# Patient Record
Sex: MALE | Race: WHITE | Employment: FULL TIME | URBAN - METROPOLITAN AREA
[De-identification: names, ages, dates, MRNs, and addresses within clinical notes are randomized per-mention and may not be internally consistent; named-entity substitution may affect disease eponyms.]

---

## 2021-02-05 ENCOUNTER — APPOINTMENT (OUTPATIENT)
Dept: RADIOLOGY | Facility: CLINIC | Age: 26
End: 2021-02-05
Payer: COMMERCIAL

## 2021-02-05 ENCOUNTER — OFFICE VISIT (OUTPATIENT)
Dept: URGENT CARE | Facility: CLINIC | Age: 26
End: 2021-02-05
Payer: COMMERCIAL

## 2021-02-05 VITALS
OXYGEN SATURATION: 98 % | BODY MASS INDEX: 22.22 KG/M2 | RESPIRATION RATE: 18 BRPM | TEMPERATURE: 97.7 F | WEIGHT: 150 LBS | HEART RATE: 98 BPM | DIASTOLIC BLOOD PRESSURE: 83 MMHG | SYSTOLIC BLOOD PRESSURE: 151 MMHG | HEIGHT: 69 IN

## 2021-02-05 DIAGNOSIS — S42.022A CLOSED DISPLACED FRACTURE OF SHAFT OF LEFT CLAVICLE, INITIAL ENCOUNTER: Primary | ICD-10-CM

## 2021-02-05 DIAGNOSIS — M89.8X1 PAIN OF LEFT CLAVICLE: ICD-10-CM

## 2021-02-05 PROCEDURE — 73000 X-RAY EXAM OF COLLAR BONE: CPT

## 2021-02-05 PROCEDURE — 99203 OFFICE O/P NEW LOW 30 MIN: CPT | Performed by: NURSE PRACTITIONER

## 2021-02-05 RX ORDER — CLONAZEPAM 0.5 MG/1
0.75 TABLET ORAL 2 TIMES DAILY
COMMUNITY

## 2021-02-05 NOTE — PATIENT INSTRUCTIONS
Rest, ice  Keep immobilizer on  Motrin andTylenol as needed for pain  If you develop any increased pain, swelling, numbness, tingling, or any new or concerning symptoms please return or proceed to ER  Follow up with pcp in 3-5 days  Recommend proceeding to ER to see ortho  Clavicle Fracture   WHAT YOU NEED TO KNOW:   A clavicle fracture is a crack or break in your clavicle (collarbone)  DISCHARGE INSTRUCTIONS:   Return to the emergency department if:   · Your shoulder, arm, hand, or fingers turn blue or pale, or feel cold or numb  · Your pain gets worse, even after rest and medicine  · Your splint feels tight, or you have increased swelling  · You cannot move your fingers  Call your doctor if:   · Your sling or wrap comes off or gets damaged  · You have questions or concerns about your condition or care  Medicines: You may  need any of the following:  · Acetaminophen  decreases pain and fever  It is available without a doctor's order  Ask how much to take and how often to take it  Follow directions  Read the labels of all other medicines you are using to see if they also contain acetaminophen, or ask your doctor or pharmacist  Acetaminophen can cause liver damage if not taken correctly  Do not use more than 4 grams (4,000 milligrams) total of acetaminophen in one day  · NSAIDs , such as ibuprofen, help decrease swelling, pain, and fever  This medicine is available with or without a doctor's order  NSAIDs can cause stomach bleeding or kidney problems in certain people  If you take blood thinner medicine, always ask your healthcare provider if NSAIDs are safe for you  Always read the medicine label and follow directions  · Take your medicine as directed  Contact your healthcare provider if you think your medicine is not helping or if you have side effects  Tell him or her if you are allergic to any medicine  Keep a list of the medicines, vitamins, and herbs you take   Include the amounts, and when and why you take them  Bring the list or the pill bottles to follow-up visits  Carry your medicine list with you in case of an emergency  Sling or brace care: You will have a sling or a brace to keep your clavicle from moving while it heals  Ask your healthcare provider for more information on how to care for the sling or brace, including how to adjust it  Apply ice:  Apply ice on your clavicle for 15 to 20 minutes every hour or as directed  Use an ice pack, or put crushed ice in a plastic bag  Cover the bag with a towel before you apply it to your clavicle  Ice decreases swelling and pain  Activity:  Limit activity as directed by your healthcare provider  Slowly start to do more each day as the pain decreases  Physical therapy:  Physical therapy may be recommended after your clavicle heals  A physical therapist teaches you exercises to help improve movement and strength, and to decrease pain  Follow up with your doctor within 1 week or as directed: You may need to return for more x-rays to see how well your clavicle is healing  Write down your questions so you remember to ask them during your visits  © Copyright [address] [place] [address] Information is for End User's use only and may not be sold, redistributed or otherwise used for commercial purposes  All illustrations and images included in CareNotes® are the copyrighted property of [employer]  or [facility] [employer]   The above information is an  only  It is not intended as medical advice for individual conditions or treatments  Talk to your doctor, nurse or pharmacist before following any medical regimen to see if it is safe and effective for you

## 2021-02-05 NOTE — PROGRESS NOTES
[facility]        NAME: Yoel Simmons is a 22 y o  male  : 1995    MRN: [id number]  DATE: 2021  TIME: 5:12 PM    Assessment and Plan   Pain of left clavicle [M89 8X1]  1  Pain of left clavicle  XR clavicle left     Left clavicular fracture on xray  Shoulder immobilizer applied  Patient was advised to proceed to ER for further evaluation, patient refusing at this time  Risks explained in length, patient verbalizes understanding  Patient states that he lives 2 hours away and he will go home and go to an ER there  AMA form signed and on chart/      Patient Instructions     Patient Instructions     Rest, ice  Keep immobilizer on  Motrin andTylenol as needed for pain  If you develop any increased pain, swelling, numbness, tingling, or any new or concerning symptoms please return or proceed to ER  Follow up with pcp in 3-5 days  Recommend proceeding to ER to see ortho  Clavicle Fracture   WHAT YOU NEED TO KNOW:   A clavicle fracture is a crack or break in your clavicle (collarbone)  DISCHARGE INSTRUCTIONS:   Return to the emergency department if:   · Your shoulder, arm, hand, or fingers turn blue or pale, or feel cold or numb  · Your pain gets worse, even after rest and medicine  · Your splint feels tight, or you have increased swelling  · You cannot move your fingers  Call your doctor if:   · Your sling or wrap comes off or gets damaged  · You have questions or concerns about your condition or care  Medicines: You may  need any of the following:  · Acetaminophen  decreases pain and fever  It is available without a doctor's order  Ask how much to take and how often to take it  Follow directions  Read the labels of all other medicines you are using to see if they also contain acetaminophen, or ask your doctor or pharmacist  Acetaminophen can cause liver damage if not taken correctly   Do not use more than 4 grams (4,000 milligrams) total of acetaminophen in one day  · NSAIDs , such as ibuprofen, help decrease swelling, pain, and fever  This medicine is available with or without a doctor's order  NSAIDs can cause stomach bleeding or kidney problems in certain people  If you take blood thinner medicine, always ask your healthcare provider if NSAIDs are safe for you  Always read the medicine label and follow directions  · Take your medicine as directed  Contact your healthcare provider if you think your medicine is not helping or if you have side effects  Tell him or her if you are allergic to any medicine  Keep a list of the medicines, vitamins, and herbs you take  Include the amounts, and when and why you take them  Bring the list or the pill bottles to follow-up visits  Carry your medicine list with you in case of an emergency  Sling or brace care: You will have a sling or a brace to keep your clavicle from moving while it heals  Ask your healthcare provider for more information on how to care for the sling or brace, including how to adjust it  Apply ice:  Apply ice on your clavicle for 15 to 20 minutes every hour or as directed  Use an ice pack, or put crushed ice in a plastic bag  Cover the bag with a towel before you apply it to your clavicle  Ice decreases swelling and pain  Activity:  Limit activity as directed by your healthcare provider  Slowly start to do more each day as the pain decreases  Physical therapy:  Physical therapy may be recommended after your clavicle heals  A physical therapist teaches you exercises to help improve movement and strength, and to decrease pain  Follow up with your doctor within 1 week or as directed: You may need to return for more x-rays to see how well your clavicle is healing  Write down your questions so you remember to ask them during your visits  © Copyright [address] Information is for End User's use only and may not be sold, redistributed or otherwise used for commercial purposes   All illustrations and images included in CareNotes® are the copyrighted property of [employer]  or [facility] [employer]   The above information is an  only  It is not intended as medical advice for individual conditions or treatments  Talk to your doctor, nurse or pharmacist before following any medical regimen to see if it is safe and effective for you  Follow up with PCP in 3-5 days  Proceed to  ER if symptoms worsen  Chief Complaint     Chief Complaint   Patient presents with    Collarbone Injury     collar bone or shoulder injury skiing today  History of Present Illness       22year old presents today with pain to his left clavicle  He reports he was snowboarding at [employer] and he went over a jump and landed with his arm outstretched and felt a pop in his collar bone area  He denies any LOC or any other injuries  He denies previous history of fracture/surgery to his L collar bone  Patient states that he was wearing a helmet  Denies any head injury  Denies any head, neck, or back pain  Denies any numbness, tingling, or decreased sensation of left upper extremity  Review of Systems   Review of Systems   Constitutional: Negative  HENT: Negative  Eyes: Negative for photophobia and visual disturbance  Respiratory: Negative for chest tightness and shortness of breath  Cardiovascular: Negative for chest pain and palpitations  Gastrointestinal: Negative for abdominal pain  Genitourinary: Negative for difficulty urinating and flank pain  Musculoskeletal: Positive for joint swelling (left clavicle)  Negative for arthralgias, back pain, myalgias, neck pain and neck stiffness  Skin: Negative for rash and wound  Neurological: Negative for dizziness, syncope, facial asymmetry, weakness, light-headedness, numbness and headaches           Current Medications       Current Outpatient Medications:     clonazePAM (KlonoPIN) 0 5 mg tablet, Take 0 75 mg by mouth 2 (two) times a day, Disp: , Rfl:     Current Allergies     Allergies as of 02/05/2021    (No Known Allergies)            The following portions of the patient's history were reviewed and updated as appropriate: allergies, current medications, past family history, past medical history, past social history, past surgical history and problem list      Past Medical History:   Diagnosis Date    Asthma        Past Surgical History:   Procedure Laterality Date    CHOLECYSTECTOMY         History reviewed  No pertinent family history  Medications have been verified  Objective   /83   Pulse 98   Temp 97 7 °F (36 5 °C)   Resp 18   Ht 5' 9" (1 753 m)   Wt 68 kg (150 lb)   SpO2 98%   BMI 22 15 kg/m²   No LMP for male patient  Physical Exam     Physical Exam  Constitutional:       Appearance: Normal appearance  HENT:      Head: Normocephalic and atraumatic  Nose: Nose normal    Eyes:      Pupils: Pupils are equal, round, and reactive to light  Neck:      Musculoskeletal: Normal range of motion  No neck rigidity or muscular tenderness  Cardiovascular:      Rate and Rhythm: Normal rate and regular rhythm  Pulses: Normal pulses  Radial pulses are 2+ on the right side and 2+ on the left side  Heart sounds: Normal heart sounds  Pulmonary:      Effort: Pulmonary effort is normal  No respiratory distress  Breath sounds: Normal breath sounds  Chest:      Chest wall: No tenderness  Abdominal:      General: Abdomen is flat  Bowel sounds are normal       Palpations: Abdomen is soft  Musculoskeletal:         General: Swelling (left clavicle), tenderness, deformity and signs of injury present  Left shoulder: Normal  He exhibits normal range of motion, no tenderness, no bony tenderness, no swelling, no crepitus and no deformity  Cervical back: Normal       Thoracic back: Normal       Lumbar back: Normal    Skin:     General: Skin is warm and dry        Capillary Refill: Capillary refill takes less than 2 seconds  Neurological:      Mental Status: He is alert  Psychiatric:         Mood and Affect: Mood normal          Behavior: Behavior normal  Behavior is cooperative